# Patient Record
Sex: MALE | Race: OTHER | Employment: FULL TIME | URBAN - METROPOLITAN AREA
[De-identification: names, ages, dates, MRNs, and addresses within clinical notes are randomized per-mention and may not be internally consistent; named-entity substitution may affect disease eponyms.]

---

## 2017-12-15 ENCOUNTER — HOSPITAL ENCOUNTER (EMERGENCY)
Age: 32
Discharge: HOME | End: 2017-12-15
Attending: EMERGENCY MEDICINE
Payer: COMMERCIAL

## 2017-12-15 ENCOUNTER — APPOINTMENT (OUTPATIENT)
Dept: GENERAL RADIOLOGY | Age: 32
End: 2017-12-15
Attending: EMERGENCY MEDICINE
Payer: COMMERCIAL

## 2017-12-15 VITALS
SYSTOLIC BLOOD PRESSURE: 131 MMHG | HEIGHT: 67 IN | TEMPERATURE: 97.8 F | DIASTOLIC BLOOD PRESSURE: 80 MMHG | OXYGEN SATURATION: 97 % | HEART RATE: 88 BPM | RESPIRATION RATE: 16 BRPM | BODY MASS INDEX: 23.54 KG/M2 | WEIGHT: 150 LBS

## 2017-12-15 DIAGNOSIS — S63.682A SPRAIN OF OTHER SITE OF LEFT THUMB, INITIAL ENCOUNTER: Primary | ICD-10-CM

## 2017-12-15 PROCEDURE — 73140 X-RAY EXAM OF FINGER(S): CPT

## 2017-12-15 PROCEDURE — 99283 EMERGENCY DEPT VISIT LOW MDM: CPT

## 2017-12-15 NOTE — ED PROVIDER NOTES
EMERGENCY DEPARTMENT HISTORY AND PHYSICAL EXAM    8:08 AM      Date: 12/15/2017  Patient Name: Josh Candelaria    History of Presenting Illness     Chief Complaint   Patient presents with    Finger Pain     left thumb         History Provided By: Patient    Chief Complaint: finger pain  Duration:  Days  Timing:  Acute  Location: left thumb  Quality: Aching  Severity: 4 out of 10  Modifying Factors: when not moving  Associated Symptoms: swelling and small abrasion      Additional History (Context): Josh Candelaria is a 34 y.o. male with No significant past medical history who presents with finger pain to his thumb of his left hand after he hit his finger on metal object while working last night. Since the pt has had pain in the thumb with some swelling. The pt states he did not do anything to treat the injury after. The pt states the pain feels like if he would have jammed his finger. The pt reports the pain in his thumb is mild; pt says the pain is exacerbated on movement or extension. The pt states he has had his tetanus shot in the last 5 years. The pt denies weakness and numbness. The pt had no other complaints or concerns in the ED. PCP: None        Past History     Past Medical History:  No past medical history on file. Past Surgical History:  No past surgical history on file. Family History:  No family history on file. Social History:  Social History   Substance Use Topics    Smoking status: Not on file    Smokeless tobacco: Not on file    Alcohol use Not on file       Allergies:  No Known Allergies      Review of Systems       Review of Systems   Constitutional: Negative for chills and fever. Respiratory: Negative for shortness of breath. Cardiovascular: Negative for chest pain. Gastrointestinal: Negative for diarrhea, nausea and vomiting. Musculoskeletal: Positive for arthralgias and joint swelling. Skin: Positive for wound (abrasion over left thumb. ).    Neurological: Negative for headaches. All other systems reviewed and are negative. Physical Exam     Visit Vitals    /80 (BP 1 Location: Right arm, BP Patient Position: At rest)    Pulse 88    Temp 97.8 °F (36.6 °C)    Resp 16    Ht 5' 7\" (1.702 m)    Wt 68 kg (150 lb)    SpO2 97%    BMI 23.49 kg/m2         Physical Exam   Constitutional: He is oriented to person, place, and time. He appears well-developed. HENT:   Head: Normocephalic and atraumatic. Eyes: Conjunctivae and EOM are normal.   Neck: Normal range of motion. Cardiovascular: Normal heart sounds. Exam reveals no gallop and no friction rub. No murmur heard. Pulmonary/Chest: Effort normal and breath sounds normal. No stridor. Abdominal: Soft. There is no tenderness. Musculoskeletal: Normal range of motion. He exhibits no tenderness. The pt shows normal ROM; pt shows no numbness laterally or medially. Capillary refill is less than 2 seconds. Minimal swelling. No tendon disruption. Neurological: He is alert and oriented to person, place, and time. Skin: Skin is warm and dry. Abrasion (.5 cm ) noted. He is not diaphoretic. .5cm abrasion at the base of the thumb. Abrasion is not overlying joint with no active bleeding. Psychiatric: He has a normal mood and affect. His behavior is normal.   Nursing note and vitals reviewed. Diagnostic Study Results     Labs -  No results found for this or any previous visit (from the past 12 hour(s)). Radiologic Studies -   XR THUMB LT MIN 2 V   Final Result            Medical Decision Making   Provider Notes (Medical Decision Making): Here with minimal swelling to L thumb over IP joint but good ROM and neurovasc intact. Will send for XR to eval for any acute fracture, low suspicion tendon injury. I am the first provider for this patient. I reviewed the vital signs, available nursing notes, past medical history, past surgical history, family history and social history.     Vital Signs-Reviewed the patient's vital signs. Pulse Oximetry Analysis -  97 on room air (Interpretation)    Records Reviewed: Old Medical Records (Time of Review: 8:08 AM)    ED Course: Progress Notes, Reevaluation, and Consults:    9:01 AM The pt agrees with the plan to go home with a thumb splint. I signed paperwork saying the pt can go back to work on light duty. The pt agrees to follow up with PCP.    -Re-evaluted the patient, he is feeling better and agrees with plan of treatment.  -Results including the pt LT Thumb XR shows no acute fracture- dislocation. Were discussed and reviewed with pt who understood the implications. Otherwise reassuring results     -We discussed the diagnosis, treatment, and plan. Next steps in close outpt care include: the pt follows up with his PCP in the next 1-2 weeks. -They verbally convey understanding and agreement of the signs, symptoms, diagnosis, treatment and prognosis and additionally agree to follow up as discussed. All questions were answered, and we reviewed pertinent return precautions as seen in the discharge paperwork. Pt understood follow up instructions, and would return to the ED if any worsening or concerns. José Miguel Hess MD  8:25 AM    Diagnosis     Clinical Impression:   1.  Sprain of other site of left thumb, initial encounter        Disposition: Discharge    Follow-up Information     Follow up With Details Comments Contact Info    [facility] EMERGENCY DEPT  If symptoms worsen, As needed [address] [place] [id number]  [phone]           There are no discharge medications for this patient.    _______________________________    Attestations:  Rick Hutton 89 acting as a scribe for and in the presence of José Miguel Hess MD      December 15, 2017 at 8:08 AM       Provider Attestation:      I personally performed the services described in the documentation, reviewed the documentation, as recorded by the scribe in my presence, and it accurately and completely records my words and actions.  December 15, 2017 at 8:08 ELIZABETH Meyer MD    _______________________________

## 2017-12-15 NOTE — DISCHARGE INSTRUCTIONS
move.  2. Slowly bend your affected finger. Hold for about 6 seconds. Then straighten your finger. 3. Repeat 8 to 12 times. Imaginary ball squeeze    1. Pretend to hold an imaginary ball. 2. Slowly bend your fingers around the imaginary ball, and squeeze the \"ball\" for about 6 seconds. Then slowly straighten your fingers to release the \"ball. \"  3. Repeat 8 to 12 times. Tendon glides    1. In this exercise, the steps follow one another to a make a continuous movement. 2. Hold your hand upward. Your fingers and thumb will be pointing straight up. Your wrist should be relaxed, following the line of your fingers and thumb. 3. Curl your fingers so that the top two joints in them are bent, and your fingers wrap down. Your fingertips should touch or be near the base of your fingers. Your fingers will look like a hook. 4. Make a fist by bending your knuckles. Your thumb can gently rest against your index (pointing) finger. 5. Unwind your fingers slightly so that your fingertips can touch the base of your palm. Your thumb can rest against your index finger. 6. Move back to your starting position, with your fingers and thumb pointing up. 7. Repeat the series of motions 8 to 12 times. Towel squeeze    1. Place a small towel roll on a table. 2. With your palm facing down, grab the towel and squeeze it for about 6 seconds. Then slowly straighten your fingers to release the towel. 3. Repeat 8 to 12 times. Towel grab    1. Fold a small towel in half, and lay it flat on a table. 2. Put your hand flat on the towel, palm down. Grab the towel, and scrunch it toward you until your hand is in a fist.  3. Slowly straighten your fingers to push the towel back so it is flat on the table again. 4. Repeat 8 to 12 times. Follow-up care is a key part of your treatment and safety. Be sure to make and go to all appointments, and call your doctor if you are having problems.  It's also a good idea to know your test results and keep a list of the medicines you take. Where can you learn more? Go to http://[employer]ner-[employer].info/. Enter [phone] [id number] [phone] in the search box to learn more about \"Finger Sprain: Rehab Exercises. \"  Current as of: March 21, 2017  Content Version: 11.4  © 7067-7537 [employer]. Care instructions adapted under license by "[employer] (which disclaims liability or warranty for this information). If you have questions about a medical condition or this instruction, always ask your healthcare professional. [address] any warranty or liability for your use of this information.

## 2017-12-15 NOTE — LETTER
NOTIFICATION RETURN TO WORK / SCHOOL 
 
12/15/2017 9:12 AM 
 
Mr. Kathrynn Kayser [address] 
[address] [facility] To Whom It May Concern: 
 
Kathrynn Kayser is currently under the care of [facility] EM[facility]GENCY DE[facility]. He will return to work/school on: 12/15/2017 The pt is able to return to work but should remain on light duty until the pt is seen by his PCP. If there are questions or concerns please have the patient contact our office.  
 
 
 
Sincerely, 
 
 
 
JO MEZA MD

## 2017-12-15 NOTE — ED TRIAGE NOTES
Injured left thumb last night while on [employer]. States he \"hit it hard\" to metal while unloading heavy hose. C/o pain and swelling.